# Patient Record
Sex: FEMALE | Race: AMERICAN INDIAN OR ALASKA NATIVE | NOT HISPANIC OR LATINO | URBAN - METROPOLITAN AREA
[De-identification: names, ages, dates, MRNs, and addresses within clinical notes are randomized per-mention and may not be internally consistent; named-entity substitution may affect disease eponyms.]

---

## 2024-03-07 ENCOUNTER — APPOINTMENT (RX ONLY)
Dept: URBAN - METROPOLITAN AREA CLINIC 28 | Facility: CLINIC | Age: 34
Setting detail: DERMATOLOGY
End: 2024-03-07

## 2024-03-07 DIAGNOSIS — D22 MELANOCYTIC NEVI: ICD-10-CM

## 2024-03-07 DIAGNOSIS — L21.8 OTHER SEBORRHEIC DERMATITIS: ICD-10-CM | Status: INADEQUATELY CONTROLLED

## 2024-03-07 DIAGNOSIS — L65.0 TELOGEN EFFLUVIUM: ICD-10-CM

## 2024-03-07 PROBLEM — D22.39 MELANOCYTIC NEVI OF OTHER PARTS OF FACE: Status: ACTIVE | Noted: 2024-03-07

## 2024-03-07 PROCEDURE — ? PATIENT EDUCATION

## 2024-03-07 PROCEDURE — ? OBSERVATION AND MEASURE

## 2024-03-07 PROCEDURE — ? COUNSELING

## 2024-03-07 PROCEDURE — 99203 OFFICE O/P NEW LOW 30 MIN: CPT

## 2024-03-07 PROCEDURE — ? ORDER TESTS

## 2024-03-07 ASSESSMENT — LOCATION DETAILED DESCRIPTION DERM: LOCATION DETAILED: LEFT SUPERIOR PREAURICULAR CHEEK

## 2024-03-07 ASSESSMENT — LOCATION ZONE DERM: LOCATION ZONE: FACE

## 2024-03-07 ASSESSMENT — LOCATION SIMPLE DESCRIPTION DERM: LOCATION SIMPLE: LEFT CHEEK

## 2024-03-07 NOTE — HPI: HAIR LOSS
What Hair Products Do You Use?: Regular shampoos
Additional History: She is 4 months postpartum, gave birth to her first child 4 months ago. She notices it is breaking from the root itself, she notices it all the time-showers, brushing etc. History of low vitamin D

## 2024-03-07 NOTE — PROCEDURE: OBSERVATION
Detail Level: Detailed
Size Of Lesion: 5 mm
Instructions (Optional): The patient reports that this mole has been present since birth without change. She'll report changes to a dermatologist.
Suturegard Body: The suture ends were repeatedly re-tightened and re-clamped to achieve the desired tissue expansion.

## 2024-03-07 NOTE — PROCEDURE: COUNSELING
Detail Level: Detailed
Patient Specific Counseling (Will Not Stick From Patient To Patient): -\\nDiscussed recommended increasing frequency of shampooing hair with dandruff shampoos. List of dandruff shampoos given to patient today
Patient Specific Counseling (Will Not Stick From Patient To Patient): -\\n\\nDiscussed in detail what TE is and possible causes of it for her including patient recently giving birth, low thyroid and low vitamin D. Discussed acute TE vs chronic TE. Also continue taking multivitamin (patient is taking post-abilio vitamin) Discussed options include no treatment versus trying OTC rogaine 5% at night time.  Also discussed could order tests to see if patient needs to be taking supplements or following up with PCP. Will order Vitamin D, zinc, biotin, TSH, and ferretin tests.

## 2024-03-07 NOTE — PROCEDURE: ORDER TESTS
Lab Facility: [id number]
Bill For Surgical Tray: no
Billing Type: Third-Party Bill
Performing Laboratory: [id number]
Expected Date Of Service: 03/07/2024